# Patient Record
Sex: FEMALE | Race: WHITE | NOT HISPANIC OR LATINO | Employment: STUDENT | URBAN - METROPOLITAN AREA
[De-identification: names, ages, dates, MRNs, and addresses within clinical notes are randomized per-mention and may not be internally consistent; named-entity substitution may affect disease eponyms.]

---

## 2017-06-15 ENCOUNTER — TRANSCRIBE ORDERS (OUTPATIENT)
Dept: ADMINISTRATIVE | Facility: HOSPITAL | Age: 22
End: 2017-06-15

## 2017-06-15 DIAGNOSIS — G40.319 GENERALIZED CONVULSIVE EPILEPSY WITH INTRACTABLE EPILEPSY (HCC): Primary | ICD-10-CM

## 2017-06-27 ENCOUNTER — HOSPITAL ENCOUNTER (OUTPATIENT)
Dept: NEUROLOGY | Facility: AMBULATORY SURGERY CENTER | Age: 22
Discharge: HOME | End: 2017-06-27
Payer: COMMERCIAL

## 2017-06-27 DIAGNOSIS — G40.319 GENERALIZED CONVULSIVE EPILEPSY WITH INTRACTABLE EPILEPSY (HCC): ICD-10-CM

## 2017-06-27 PROCEDURE — 95819 EEG AWAKE AND ASLEEP: CPT

## 2017-06-29 ENCOUNTER — GENERIC CONVERSION - ENCOUNTER (OUTPATIENT)
Dept: OTHER | Facility: OTHER | Age: 22
End: 2017-06-29

## 2017-07-05 ENCOUNTER — ALLSCRIPTS OFFICE VISIT (OUTPATIENT)
Dept: OTHER | Facility: OTHER | Age: 22
End: 2017-07-05

## 2017-07-05 LAB
BACTERIA UR QL AUTO: NEGATIVE
BILIRUB UR QL STRIP: NORMAL
CLARITY UR: NORMAL
CLUE CELL (HISTORICAL): NEGATIVE
COLOR UR: NORMAL
GLUCOSE (HISTORICAL): NORMAL
HGB UR QL STRIP.AUTO: NORMAL
HYPHAL YEAST (HISTORICAL): NEGATIVE
KETONES UR STRIP-MCNC: NORMAL MG/DL
KOH PREP (HISTORICAL): NEGATIVE
LEUKOCYTE ESTERASE UR QL STRIP: NORMAL
NITRITE UR QL STRIP: NORMAL
PH UR STRIP.AUTO: 4.5 [PH]
PH UR STRIP.AUTO: 5 [PH]
PROT UR STRIP-MCNC: NORMAL MG/DL
SP GR UR STRIP.AUTO: 1.03
TRICHOMONAS (HISTORICAL): NEGATIVE
UROBILINOGEN UR QL STRIP.AUTO: 0.2
YEAST (HISTORICAL): POSITIVE

## 2017-07-06 ENCOUNTER — LAB REQUISITION (OUTPATIENT)
Dept: LAB | Facility: HOSPITAL | Age: 22
End: 2017-07-06
Payer: COMMERCIAL

## 2017-07-06 DIAGNOSIS — N89.8 OTHER SPECIFIED NONINFLAMMATORY DISORDER OF VAGINA: ICD-10-CM

## 2017-07-06 PROCEDURE — 87660 TRICHOMONAS VAGIN DIR PROBE: CPT | Performed by: PHYSICIAN ASSISTANT

## 2017-07-06 PROCEDURE — 87510 GARDNER VAG DNA DIR PROBE: CPT | Performed by: PHYSICIAN ASSISTANT

## 2017-07-06 PROCEDURE — 87480 CANDIDA DNA DIR PROBE: CPT | Performed by: PHYSICIAN ASSISTANT

## 2017-07-08 LAB
CANDIDA RRNA VAG QL PROBE: POSITIVE
G VAGINALIS RRNA GENITAL QL PROBE: POSITIVE
T VAGINALIS RRNA GENITAL QL PROBE: NEGATIVE

## 2017-07-21 ENCOUNTER — GENERIC CONVERSION - ENCOUNTER (OUTPATIENT)
Dept: OTHER | Facility: OTHER | Age: 22
End: 2017-07-21

## 2017-07-27 ENCOUNTER — LAB REQUISITION (OUTPATIENT)
Dept: LAB | Facility: HOSPITAL | Age: 22
End: 2017-07-27
Payer: COMMERCIAL

## 2017-07-27 ENCOUNTER — ALLSCRIPTS OFFICE VISIT (OUTPATIENT)
Dept: OTHER | Facility: OTHER | Age: 22
End: 2017-07-27

## 2017-07-27 DIAGNOSIS — N89.8 OTHER SPECIFIED NONINFLAMMATORY DISORDER OF VAGINA: ICD-10-CM

## 2017-07-27 LAB
BACTERIA UR QL AUTO: NEGATIVE
CLUE CELL (HISTORICAL): NEGATIVE
HYPHAL YEAST (HISTORICAL): NEGATIVE
PH UR STRIP.AUTO: NORMAL [PH]
TRICHOMONAS (HISTORICAL): NEGATIVE
YEAST (HISTORICAL): POSITIVE

## 2017-07-27 PROCEDURE — 87480 CANDIDA DNA DIR PROBE: CPT | Performed by: PHYSICIAN ASSISTANT

## 2017-07-27 PROCEDURE — 87510 GARDNER VAG DNA DIR PROBE: CPT | Performed by: PHYSICIAN ASSISTANT

## 2017-07-27 PROCEDURE — 87660 TRICHOMONAS VAGIN DIR PROBE: CPT | Performed by: PHYSICIAN ASSISTANT

## 2017-07-28 LAB
CANDIDA RRNA VAG QL PROBE: NEGATIVE
G VAGINALIS RRNA GENITAL QL PROBE: NEGATIVE
T VAGINALIS RRNA GENITAL QL PROBE: NEGATIVE

## 2017-11-20 ENCOUNTER — GENERIC CONVERSION - ENCOUNTER (OUTPATIENT)
Dept: OTHER | Facility: OTHER | Age: 22
End: 2017-11-20

## 2017-12-26 ENCOUNTER — GENERIC CONVERSION - ENCOUNTER (OUTPATIENT)
Dept: OTHER | Facility: OTHER | Age: 22
End: 2017-12-26

## 2018-01-11 NOTE — PROCEDURES
Procedures by Manpreet Sandoval MD  at 2017  7:49 PM      Author:  Manpreet Sandoval MD Service:  (none) Author Type:  Physician    Filed:  2017  7:50 PM Date of Service:  2017  7:49 PM Status:  Signed    :  Manpreet Sandoval MD (Physician)         [address]  [place], [place]  Phone [phone]  Fax [phone]                                Patient Name: Javy Mills       Date performed:  2017 Medical Record #: [id number]   Report date: 17 File #: [id number]    Referring physician: Dr Manpreet Sandoval ACCT#: -   : 1995 Study type: Routine, awake and asleep   Age: 25 y Technologist: SERENA Lisa EEG  T   Location: Outpatient ICD diagnosis: -         ELECTROENCEPHALOGRAM      Patient History:  27 yr old female presents for a sleep deprived EEG  She was diagnosed with primary generalized epilepsy, based upon her friends telling her that  she was having episodes of unresponsive zoning out and would stop mid-sentence then continue talking as before; also abnormal EEGs  She remains successfully maintained on Keppra and reports that she is doing well with no symptoms at this time  Her last seizure was three years ago  She does have a family history of epilepsy (mom)  Medications:  Levetiracetam (Keppra), Folic acid, Vit D, birth control      Description of Procedure: This EEG was performed with simultaneous video recording  Electrodes were applied using the International 10-20 System, with additional electrodes used including EOG, ECG and T1/T2  The EEG was recorded from 7:42:24 AM  until 08:56:42 AM for  a total of 39 3 minutes  The recording was technically satisfactory for interpretation  Skull Defect (if any):  None  Findings:     State: This sleep-deprived EEG was obtained during wakefulness, drowsiness, and un-sedated light sleep  Activity:    The background during wakefulness consisted of moderate amplitude, very well-modulated 9 - 10 Hz alpha activity located symmetrically over the posterior head regions with good reactivity and attenuation to eye opening  Upon repeated  eye closure, a fixation-off phenomenon was specifically not noted on this current study  Mu rhythms were seen intermittently in the rolandic regions  Photic stimulation produced a prominent and symmetric occipital  driving response at supra-harmonics, to a broad range of flash frequencies, and was specifically non-activating for photo-paroxysmal abnormalities  Drowsiness and light sleep were reflected by slowing and attenuation of background rhythms, accompanied  by very well-formed and symmetrically placed sleep transients including POSTs, BETS, Vertex waves, sleep spindles and K-complexes  Abnormal findings:   Hyperventilation, produced several bursts of sharply-countoured theta-frequency activity in the right & left mid-postero-temporal regions independently, and rare discrete sharp waves arising from the same derivations  During drowsiness and early Stage 2 sleep, there were two occurrences of low-amplitude, bi-occipital spike-slow wave transients of distinctly different morphology than her prevalent POSTs  No sustained ictal rhythmic discharges were noted  Other findings: The single lead ECG revealed a sinus arrhythmia ranging from 60 72 bpm     Interpretation: This is a mildly abnormal sleep-deprived EEG due to both bi-occipital and multi-focal sharp activity suggesting continued epileptogenic potential , and consistent with this patients concurrent clinical diagnoses of primary generalized  and localization-related epilepsies  However, a photo-paroxysmal response is no longer evident  Ana LOMBARDO  [facility]                       Received for:Provider  EPIC   Jun 29 2017  7:50PM [facility] Standard Time

## 2018-01-11 NOTE — PROGRESS NOTES
Assessment    1  Counseling for initiation of birth control method (V2) (Z30 9)    Plan  Counseling for initiation of birth control method    · Lo Loestrin Fe 1 MG-10 MCG / 10 MCG Oral Tablet; TAKE 1 TABLET DAILY   Rx By: Saul Stoddard; Dispense: 28 Days ; #:28 Tablet; Refill: 9; For: Counseling for initiation of birth control method; GOPAL = N; Sent To: [facility]ARM[facility]Y #[id number]   Discussion/Summary  Contraception: Impression: contraception management  The goals of contraception include cycle control and to improve dysmenorrhea  Currently, the patient has satisfactory contraception, tolerable side effects and symptoms that are well controlled  There are no changes in medication management  Patient discussion: discussed with the patient, 20 minute visit, greater than half of the time was spent on counseling  Discussion Summary:   Patient will have her blood pressure taken while she is in school  We'll call us if there is any problems with an elevation  We'll also see her when she comes home on her summer break  Will start the Gardasil then so we can get the first 2 injections in and do the third when she is on  break         Chief Complaint  Chief Complaint Free Text Note Form: Pt is here for pill check  History of Present Illness  HPI: 44-year-old  0 para 0 who does not get her period on birth control here today for a pill check  She denies aches  She had an episode in school last week where she wasn't feeling well was running a temperature had a high pulse and blood pressure but was diagnosed with a kidney infection  Today her blood pressures following  She also states she is interested in getting the Gardasil injection  Active Problems    1  Counseling for initiation of birth control method () (Z30 9)   2  Encounter for gynecological examination without abnormal finding () (Z01 419)    Social History    · Never a smoker    Current Meds   1  Folic Acid TABS;    Therapy: (Recorded:62Uin7471) to Recorded   2  Keppra 500 MG Oral Tablet; Therapy: (Recorded:38Bhj0307) to Recorded   3  Lo Loestrin Fe 1 MG-10 MCG / 10 MCG Oral Tablet; TAKE 1 TABLET DAILY; Therapy: 23Ukg8250 to (Evaluate:08Jbg4660)  Requested for: 80Mys8482; Last   Rx:50Wxf6603 Ordered    Allergies    1  No Known Drug Allergies    Vitals  Vital Signs    Recorded: 28CCJ2780 60:31GL   Systolic 796, LUE   Diastolic 72, LUE   Height 5 ft 1 5 in   Weight 115 lb    BMI Calculated 21 38   BSA Calculated 1 5   BMI Percentile 47 %   2-20 Stature Percentile 14 %   2-20 Weight Percentile 25 %   LMP 12/6/2016/spotting     Signatures   Electronically signed by : Elena Chapin CNM;  Dec 20 2016  2:58PM EST                       (Author)

## 2018-01-12 NOTE — MISCELLANEOUS
Message   Recorded as Task   Date: 07/20/2017 03:01 PM, Created By: Iwona Leija   Task Name: Call Back   Assigned To: [facility],Team   Regarding Patient: Chiquis Cochran, Status: In Progress   Comment:    Evelyn Phelps - 20 Jul 2017 3:01 PM     TASK CREATED  Caller: Self; Other; [phone] (Home)  pt is taking meds for Kyr[place] & bacterial inf, she thinks she still has yeast inf,  she does NOT have an odor, but discharge is white & clumpy, pls call pt [phone]   Aaliyah Vidales - 20 Jul 2017 3:05 PM     TASK IN PROGRESS   Aaliyah Vidales - 20 Jul 2017 3:14 PM     TASK EDITED  Spoke with pt - she was seen on 07/05, saw Wendy Kirkland for yeast infection and BV symptoms  Was rx Diflucan and Metronidazole  Diflucan last taken 07/08 and she finished the Metronidazole 2 days ago - Pt states she is still experiencing yeast infection symptoms - has a thick white clumpy discharge and burning during urination  No itching or odor  She would like a refill on the Diflucan  Please advise  Thanks! Mariela Gonzales - 20 Jul 2017 4:44 PM     TASK REPLIED TO: Previously Assigned To Mariela Gonzales  OK - one more diflucan tablet then if not resolved she must schedule another visit to be seen  Thanks   Aaliyah Vidales - 21 Jul 2017 8:09 AM     TASK EDITED  Spoke with pt - advised RI7 recommended Diflucan  Stated if the sx don't resolve after 48 hours of last pill taken to call to make an appointment  Active Problems    1  Counseling for initiation of birth control method (V25 02) (Z30 09)   2  Encounter for gynecological examination without abnormal finding (V72 31) (Z01 419)   3  Vaginal discharge (623 5) (N89 8)   4  Vaginal discharge (623 5) (N89 8)    Current Meds   1  Fluconazole 150 MG Oral Tablet; TAKE 1 TABLET NOW AND AGAIN IN 3 DAYS; Therapy: 24VWO4914 to (Last Rx:53Bbn4568)  Requested for: 51KPK9443 Ordered   2  Folic Acid TABS; Therapy: (Recorded:63Lcq2454) to Recorded   3   Keppra 500 MG Oral Tablet (LevETIRAcetam); Therapy: (Recorded:64Nju8449) to Recorded   4  Lo Loestrin Fe 1 MG-10 MCG / 10 MCG Oral Tablet; TAKE 1 TABLET DAILY; Therapy: 56Mqi7711 to (Evaluate:16Jnr4400)  Requested for: 98Yal5056; Last   Rx:49Qfe1405 Ordered   5  Lo Loestrin Fe 1 MG-10 MCG / 10 MCG Oral Tablet; TAKE 1 TABLET DAILY; Therapy: 30Trm7921 to (Evaluate:83Cwi5962)  Requested for: 51Ylf4829; Last   Rx:02Rea0898 Ordered   6  MetroNIDAZOLE 0 75 % Vaginal Gel (MetroGel-Vaginal); INSERT 1 APPLICATORFUL   INTRAVAGINALLY AT BEDTIME NIGHTLY; Therapy: 45MDZ8093 to (Evaluate:53Zzz3069)  Requested for: 93UBG6441; Last   Rx:01Umc1768 Ordered    Allergies    1   No Known Drug Allergies    Plan  Vaginal discharge    · Fluconazole 150 MG Oral Tablet; TAKE 1 TABLET NOW AND AGAIN IN 3 DAYS    Signatures   Electronically signed by : Oda Osler, ; Jul 21 2017  8:09AM EST                       (Author)

## 2018-01-12 NOTE — MISCELLANEOUS
Message   Recorded as Task   Date: 11/20/2017 03:45 PM, Created By: Stuart Lambert   Task Name: Med Renewal Request   Assigned To: Gill Matias   Regarding Patient: Javed Ramirez, Status: In Progress   Comment:    Ana Ramirez - 20 Nov 2017 3:45 PM     TASK CREATED  Pt called office today, left voicemail message  Pt saw JOSH Dawson on 7/27/17, scheduled to see her again on 12/19/17  Pt called to request Rx refill of her birth control pill (Lo Loestrin FE)  Pt [place] [facility] [place] [facility] [place] [place] [place] (Pharmacy address listed in Pt's EHR)  Pt can be reached @ [phone]  Thank you! Aaliyah Vidales - 20 Nov 2017 3:51 PM     TASK IN PROGRESS   Aaliyah Vidales - 20 Nov 2017 3:53 PM     TASK EDITED  Patient is currently out of town - needs refill of her OCP - ok to fill? Please advise  Thanks! Bushra Dawson - 20 Nov 2017 4:28 PM     TASK REPLIED TO: Previously Assigned To Bushra Dawson  one pack only   Sue Foot - 20 Nov 2017 6:07 PM     TASK EDITED  Rx Loloestrin, sig 1 od, 1 mo, no refills to [phone]  [facility] in [place]        Active Problems    1  Counseling for initiation of birth control method (V25 02) (Z30 09)   2  Encounter for gynecological examination without abnormal finding (V72 31) (Z01 419)   3  Vaginal burning (625 8) (N94 9)   4  Vaginal discharge (623 5) (N89 8)   5  Vaginal discharge (623 5) (N89 8)    Current Meds   1  Fluconazole 150 MG Oral Tablet; TAKE 1 TABLET NOW AND AGAIN IN 3 DAYS; Therapy: 43YTJ7493 to (Last Rx:04Kae6083)  Requested for: 92Jwj3230 Ordered   2  Folic Acid TABS; Therapy: (Recorded:91Ufz8255) to Recorded   3  Keppra 500 MG Oral Tablet (LevETIRAcetam); Therapy: (Recorded:52Pvl8191) to Recorded   4  Lo Loestrin Fe 1 MG-10 MCG / 10 MCG Oral Tablet; TAKE 1 TABLET DAILY;    Therapy: 12AWH4897 to (Evaluate:28Flm8971)  Requested for: 00Tsq0150; Last   Rx:71Dbi4776 Ordered    Allergies    1  No Known Drug Allergies    Signatures   Electronically signed by :  Diana Kasper, ; Nov 20 2017  6:11PM EST                       (Author)

## 2018-01-12 NOTE — PROCEDURES
Procedures by Ira Funez MD  at 2016  7:22 PM      Author:  Ira Funez MD Service:  (none) Author Type:  Physician     Filed:  2016  7:23 PM Date of Service:  2016  7:22 PM Status:  Signed     :  Ira Funez MD (Physician)            [address]  [place] [address] [facility]  Phone [phone]  Fax [phone]                                                  Patient Name: Sourav Hood       Date performed:  2016 Medical Record #: [id number]   Report date: 16 File #: [id number]   Referring physician: Dr Ira Funez ACCT#: -   : 1995 Study type: Routine, awake and asleep   Age: 27 y Technologist: SERENA Hooks EEG T    Location: Outpatient ICD diagnosis: -           ELECTROENCEPHALOGRAM      Patient History:  The patient is an 27 yr old female who was diagnosed with primary generalized epilepsy, Gastaut variant, based upon her friends telling her that  she was having episodes of unresponsive zoning out and previous abnormal EEGs  She remains successfully maintained on Keppra; she is feeling well with no clinical symptoms or seizures reported  She does have a family history of epilepsy  Medications:  Levetiracetam (Keppra) ,  Folic acid, Biotin, Multi vitamin, Vit D      Description of Procedure: This EEG was performed with simultaneous video recording  Electrodes were applied using the International 10-20 System, with additional electrodes used including EOG, ECG and T1/T2  The EEG was recorded from 7:46:37 AM  to 08:54:33 AM for a  total of 30 3 minutes  The recording was technically satisfactory for interpretation  Skull Defect (if any):  None  Findings:     State: This sleep-deprived EEG was obtained during wakefulness, drowsiness, and un-sedated light sleep  Activity:    The background during wakefulness consisted of moderate amplitude, well-modulated 9 - 10 Hz alpha activity located symmetrically over the posterior head regions with good reactivity and attenuation to eye opening  A solitary instance  of possible fixation-off phenomenon was noted upon eye closure at 8:26:53  this consisted of a brief (< 1 sec) paroxysm of low-amplitude, generalized bi-occipitally maximal, fast spike-slow wave activity followed by her  normal alpha background  No clinical accompaniments were noted, and other episodes of eye closure during the study were not accompanied by this finding  Hyperventilation was performed with good effort for 4 minutes; it produced a few brief bursts of sharply-contoured theta activity but no definite epileptiform transients  Photic stimulation  produced a symmetric occipital driving response to a broad range of flash frequencies, and was non-activating for abnormalities  Specifically, her previously-noted photo-paroxysmal responses were not seen on this tracing  Drowsiness and light sleep  were reflected by slowing and attenuation of background rhythms, accompanied by very well-formed, symmetrically placed sleep transients  Sleep was non-activating  Other findings: The single lead ECG revealed a normal sinus rhythm averaging 60 bpm without ectopy  Interpretation: This is an essentially normal sleep-deprived EEG, obtained during wakefulness and sleep, with the exception of a possible single instance of fixation-off epileptiform activity which would be consistent with her undewrlying  diagnosis of primary generalized epilepsy  It is improved in comparison to the previous study of 5/2015 in that photo-paroxysmal responses are no longer noted  Ana LOMBARDO  [facility]               Received for:Provider  EPIC   Jun 26 2016  7:22PM [facility] Standard Time

## 2018-01-13 VITALS — WEIGHT: 120 LBS | DIASTOLIC BLOOD PRESSURE: 66 MMHG | SYSTOLIC BLOOD PRESSURE: 102 MMHG

## 2018-01-14 VITALS — BODY MASS INDEX: 21.93 KG/M2 | DIASTOLIC BLOOD PRESSURE: 68 MMHG | WEIGHT: 118 LBS | SYSTOLIC BLOOD PRESSURE: 104 MMHG

## 2018-01-18 NOTE — MISCELLANEOUS
Message   Recorded as Task   Date: 11/20/2017 03:45 PM, Created By: Coleen Leal   Task Name: Med Renewal Request   Assigned To: Moises Still   Regarding Patient: Shanice Sullivan, Status: In Progress   Comment:    Ana Ramirez - 20 Nov 2017 3:45 PM     TASK CREATED  Pt called office today, left voicemail message  Pt saw JOSH Dawson on 7/27/17, scheduled to see her again on 12/19/17  Pt called to request Rx refill of her birth control pill (Lo Loestrin FE)  Pt [place] [facility] [place] [place] (Pharmacy address listed in Pt's EHR)  Pt can be reached @ [phone]  Thank you! Aaliyah Vidales - 20 Nov 2017 3:51 PM     TASK IN PROGRESS   Aaliyah Vidales - 20 Nov 2017 3:53 PM     TASK EDITED  Patient is currently out of town - needs refill of her OCP - ok to fill? Please advise  Thanks! Bushra Dawson - 20 Nov 2017 4:28 PM     TASK REPLIED TO: Previously Assigned To Bushra Dawson  one pack only   Terra Nathan - 20 Nov 2017 6:07 PM     TASK EDITED  Rx Loloestrin, sig 1 od, 1 mo, no refills to [phone]  [facility] in [place]        Active Problems    1  Counseling for initiation of birth control method (V25 02) (Z30 09)   2  Encounter for gynecological examination without abnormal finding (V72 31) (Z01 419)   3  Vaginal burning (625 8) (N94 9)   4  Vaginal discharge (623 5) (N89 8)   5  Vaginal discharge (623 5) (N89 8)    Current Meds   1  Fluconazole 150 MG Oral Tablet; TAKE 1 TABLET NOW AND AGAIN IN 3 DAYS; Therapy: 32AHD7141 to (Last Rx:85Wfb6355)  Requested for: 03Vaa1789 Ordered   2  Folic Acid TABS; Therapy: (Recorded:86Gxn2625) to Recorded   3  Keppra 500 MG Oral Tablet (LevETIRAcetam); Therapy: (Recorded:11Oub8318) to Recorded   4  Lo Loestrin Fe 1 MG-10 MCG / 10 MCG Oral Tablet; TAKE 1 TABLET DAILY;    Therapy: 77XGD6172 to (Evaluate:09Srl9995)  Requested for: 42Igt8678; Last   Rx:97Pjt6262 Ordered    Allergies    1  No Known Drug Allergies    Signatures   Electronically signed by :  Charo Kasper, ; Nov 20 2017  6:07PM EST                       (Author)

## 2018-01-24 VITALS
BODY MASS INDEX: 20.61 KG/M2 | SYSTOLIC BLOOD PRESSURE: 112 MMHG | WEIGHT: 112 LBS | HEIGHT: 62 IN | DIASTOLIC BLOOD PRESSURE: 62 MMHG

## 2018-04-04 ENCOUNTER — TRANSCRIBE ORDERS (OUTPATIENT)
Dept: ADMINISTRATIVE | Facility: HOSPITAL | Age: 23
End: 2018-04-04

## 2018-04-04 DIAGNOSIS — G40.319 GENERALIZED NONCONVULSIVE EPILEPSY WITH INTRACTABLE EPILEPSY (HCC): Primary | ICD-10-CM

## 2018-05-16 ENCOUNTER — HOSPITAL ENCOUNTER (OUTPATIENT)
Dept: NEUROLOGY | Facility: AMBULATORY SURGERY CENTER | Age: 23
Discharge: HOME | End: 2018-05-16
Payer: COMMERCIAL

## 2018-05-16 DIAGNOSIS — G40.319 GENERALIZED NONCONVULSIVE EPILEPSY WITH INTRACTABLE EPILEPSY (HCC): ICD-10-CM

## 2018-05-16 PROCEDURE — 95819 EEG AWAKE AND ASLEEP: CPT

## 2019-01-21 ENCOUNTER — TELEPHONE (OUTPATIENT)
Dept: OBGYN CLINIC | Facility: CLINIC | Age: 24
End: 2019-01-21

## 2019-01-21 NOTE — TELEPHONE ENCOUNTER
Pt called ans service over the weekend for a med refill  She needs to be seen for annual exam as the last time she was seen was 12/2017  LM for pt to call back

## 2019-01-22 ENCOUNTER — TELEPHONE (OUTPATIENT)
Dept: OBGYN CLINIC | Facility: MEDICAL CENTER | Age: 24
End: 2019-01-22

## 2019-01-22 DIAGNOSIS — Z30.9 ENCOUNTER FOR CONTRACEPTIVE MANAGEMENT, UNSPECIFIED TYPE: Primary | ICD-10-CM

## 2019-01-22 NOTE — TELEPHONE ENCOUNTER
Patient needs her birth control prescription but stated that she needs to be seen before it will be refilled  Patient's last yearly was 12 of 2017  Her appointment for 2018 was cancelled  She is currently in [place]

## 2019-06-03 ENCOUNTER — TRANSCRIBE ORDERS (OUTPATIENT)
Dept: ADMINISTRATIVE | Facility: HOSPITAL | Age: 24
End: 2019-06-03

## 2019-06-03 DIAGNOSIS — G40.209 CRYPTOGENIC PARTIAL COMPLEX EPILEPSY (HCC): Primary | ICD-10-CM

## 2019-07-03 ENCOUNTER — OFFICE VISIT (OUTPATIENT)
Dept: OBGYN CLINIC | Facility: CLINIC | Age: 24
End: 2019-07-03
Payer: COMMERCIAL

## 2019-07-03 VITALS — SYSTOLIC BLOOD PRESSURE: 90 MMHG | DIASTOLIC BLOOD PRESSURE: 66 MMHG | BODY MASS INDEX: 23.31 KG/M2 | WEIGHT: 125.4 LBS

## 2019-07-03 DIAGNOSIS — R39.9 UTI SYMPTOMS: Primary | ICD-10-CM

## 2019-07-03 DIAGNOSIS — N89.8 VAGINAL ITCHING: ICD-10-CM

## 2019-07-03 LAB
SL AMB  POCT GLUCOSE, UA: ABNORMAL
SL AMB LEUKOCYTE ESTERASE,UA: ABNORMAL
SL AMB POCT BILIRUBIN,UA: ABNORMAL
SL AMB POCT BLOOD,UA: ABNORMAL
SL AMB POCT CLARITY,UA: ABNORMAL
SL AMB POCT COLOR,UA: YELLOW
SL AMB POCT KETONES,UA: ABNORMAL
SL AMB POCT NITRITE,UA: ABNORMAL
SL AMB POCT PH,UA: 5
SL AMB POCT SPECIFIC GRAVITY,UA: 1
SL AMB POCT URINE PROTEIN: ABNORMAL
SL AMB POCT UROBILINOGEN: 0.2

## 2019-07-03 PROCEDURE — 81002 URINALYSIS NONAUTO W/O SCOPE: CPT | Performed by: PHYSICIAN ASSISTANT

## 2019-07-03 PROCEDURE — 99214 OFFICE O/P EST MOD 30 MIN: CPT | Performed by: PHYSICIAN ASSISTANT

## 2019-07-03 RX ORDER — LEVETIRACETAM 100 MG/ML
2500 SOLUTION ORAL DAILY
COMMUNITY

## 2019-07-03 RX ORDER — FOLIC ACID 1 MG/1
1000 TABLET ORAL DAILY
Refills: 5 | COMMUNITY
Start: 2019-06-25

## 2019-07-03 RX ORDER — NITROFURANTOIN 25; 75 MG/1; MG/1
100 CAPSULE ORAL 2 TIMES DAILY
Qty: 14 CAPSULE | Refills: 0 | Status: SHIPPED | OUTPATIENT
Start: 2019-07-03 | End: 2019-07-10

## 2019-07-03 RX ORDER — UREA 10 %
LOTION (ML) TOPICAL
COMMUNITY
End: 2019-12-03

## 2019-07-03 NOTE — PATIENT INSTRUCTIONS
Rx for Macrobid 100 mg BID x 7 days sent to pharmacy  Go to ER if symptoms become severe    F/u in September for first dose of Gardasil and November for yearly gyn exam

## 2019-07-03 NOTE — PROGRESS NOTES
Assessment/Plan:    No problem-specific Assessment & Plan notes found for this encounter  Diagnoses and all orders for this visit:    UTI symptoms  -     POCT urine dip  -     Cancel: Urine culture; Future  -     nitrofurantoin (MACROBID) 100 mg capsule; Take 1 capsule (100 mg total) by mouth 2 (two) times a day for 7 days  -     Urine culture    Vaginal itching  -     VAGINOSIS DNA PROBE (AFFIRM)    Other orders  -     levETIRAcetam (KEPPRA) 100 mg/mL oral solution; Take 2,500 mg by mouth daily  -     Norethin-Eth Estrad-Fe Biphas (LO LOESTRIN FE) 1 MG-10 MCG / 10 MCG TABS; Lo Loestrin Fe 1 mg-10 mcg (24)/10 mcg (2) tablet  -     Omega-3 Fatty Acids (FISH OIL PO); Take 1 capsule by mouth daily  -     folic acid (FOLVITE) 144 MCG tablet; Take by mouth  -     folic acid (FOLVITE) 1 mg tablet; Take 1,000 mcg by mouth daily  -     Pediatric Multiple Vitamins (CHILDRENS MULTIVITS/EXTRA C PO); Take by mouth  -     Cholecalciferol 2000 units CAPS; Take 2,000 Units by mouth daily        Urine dip negative  Sample sent for culture  Vaginal cultures done  We will call with all results  Rx for Macrobid 100 mg BID x 7 days sent to pharmacy  If symptoms become severe, patient should be seen in the ED  F/u in September for first dose of Gardasil vaccine, and November for yearly gyn exam     Subjective:      Patient ID: Vinod Mireles is a 26 y o  female  Patient is here with complaint of UTI and/or vaginal infection  She is new to our office today  Last gyn visit was in November 2018 while at school in [place]; had Pap smear at that time  Today she complains of urinary frequency, bladder pain/pressure, and burning with urination  She is also experiencing vaginal itching  Symptoms have been present for several days  Patient denies any discharge, odor, pelvic pain, abdominal pain, n/v, and fever/chills  She is sexually active; had STD screening in the fall  Interested in [address] vaccine        The following portions of the patient's history were reviewed and updated as appropriate: allergies, current medications, past family history, past medical history, past social history, past surgical history and problem list     Review of Systems   Constitutional: Negative for chills and fever  Gastrointestinal: Negative for abdominal distention, abdominal pain, nausea and vomiting  Genitourinary: Positive for dysuria and frequency  Negative for difficulty urinating, genital sores, hematuria, menstrual problem, pelvic pain, urgency, vaginal bleeding, vaginal discharge and vaginal pain  Vaginal itching  Objective:      BP 90/66   Wt 56 9 kg (125 lb 6 4 oz)   BMI 23 31 kg/m²          Physical Exam   Constitutional: She is oriented to person, place, and time  Vital signs are normal  She appears well-developed and well-nourished  Genitourinary: Uterus normal  No labial fusion  There is no rash, tenderness, lesion or injury on the right labia  There is no rash, tenderness, lesion or injury on the left labia  Cervix exhibits no motion tenderness, no discharge and no friability  Right adnexum displays no mass, no tenderness and no fullness  Left adnexum displays no mass, no tenderness and no fullness  There is erythema in the vagina  No tenderness or bleeding in the vagina  No vaginal discharge found  Lymphadenopathy: No inguinal adenopathy noted on the right or left side  Neurological: She is alert and oriented to person, place, and time  Skin: Skin is warm and dry  Psychiatric: She has a normal mood and affect  Her behavior is normal  Judgment and thought content normal    Vitals reviewed

## 2019-07-05 ENCOUNTER — TELEPHONE (OUTPATIENT)
Dept: OBGYN CLINIC | Facility: CLINIC | Age: 24
End: 2019-07-05

## 2019-07-05 ENCOUNTER — HOSPITAL ENCOUNTER (OUTPATIENT)
Dept: NEUROLOGY | Facility: HOSPITAL | Age: 24
Discharge: HOME | End: 2019-07-05
Attending: PSYCHIATRY & NEUROLOGY
Payer: COMMERCIAL

## 2019-07-05 DIAGNOSIS — G40.209 CRYPTOGENIC PARTIAL COMPLEX EPILEPSY (HCC): ICD-10-CM

## 2019-07-05 PROCEDURE — 95819 EEG AWAKE AND ASLEEP: CPT

## 2019-07-05 NOTE — TELEPHONE ENCOUNTER
Spoke with patient regarding culture results  Urine negative; can stop Macrobid  Vaginal culture positive for yeast   Recommended patient to use OTC Monistat 7 day for treatment  Call if symptoms do not resolve

## 2019-07-11 ENCOUNTER — TRANSCRIBE ORDERS (OUTPATIENT)
Dept: SLEEP CENTER | Facility: CLINIC | Age: 24
End: 2019-07-11

## 2019-07-11 DIAGNOSIS — G40.209 CRYPTOGENIC PARTIAL COMPLEX EPILEPSY (HCC): Primary | ICD-10-CM

## 2019-12-03 ENCOUNTER — OFFICE VISIT (OUTPATIENT)
Dept: OBGYN CLINIC | Facility: HOSPITAL | Age: 24
End: 2019-12-03
Payer: COMMERCIAL

## 2019-12-03 ENCOUNTER — HOSPITAL ENCOUNTER (OUTPATIENT)
Dept: RADIOLOGY | Facility: HOSPITAL | Age: 24
Discharge: HOME | End: 2019-12-03
Payer: COMMERCIAL

## 2019-12-03 VITALS
BODY MASS INDEX: 23.92 KG/M2 | DIASTOLIC BLOOD PRESSURE: 77 MMHG | SYSTOLIC BLOOD PRESSURE: 107 MMHG | HEIGHT: 62 IN | HEART RATE: 99 BPM | WEIGHT: 130 LBS

## 2019-12-03 DIAGNOSIS — M25.551 BILATERAL HIP PAIN: ICD-10-CM

## 2019-12-03 DIAGNOSIS — M76.32 ILIOTIBIAL BAND SYNDROME OF LEFT SIDE: ICD-10-CM

## 2019-12-03 DIAGNOSIS — M25.552 BILATERAL HIP PAIN: ICD-10-CM

## 2019-12-03 DIAGNOSIS — M25.551 BILATERAL HIP PAIN: Primary | ICD-10-CM

## 2019-12-03 DIAGNOSIS — M76.31 IT BAND SYNDROME, RIGHT: ICD-10-CM

## 2019-12-03 DIAGNOSIS — M25.552 BILATERAL HIP PAIN: Primary | ICD-10-CM

## 2019-12-03 PROCEDURE — 99203 OFFICE O/P NEW LOW 30 MIN: CPT | Performed by: PHYSICIAN ASSISTANT

## 2019-12-03 PROCEDURE — 72170 X-RAY EXAM OF PELVIS: CPT

## 2019-12-03 NOTE — PROGRESS NOTES
Assessment/Plan   Diagnoses and all orders for this visit:    Bilateral hip pain    Iliotibial band syndrome of left side    Iliotibial band syndrome, right    - start PT  - Ice, NSAIDs as needed  - Follow up with  sports medicine in 6 weeks          Subjective   Patient ID: Gabriela Goltz is a 27 y o  female  Vitals:    12/03/19 1623   BP: 107/77   Pulse: 80     23yo female comes in for an evaluation of her b/l hips  She started having right lateral hip pain about a month ago and it resolved with some ITB stretching  Then last week, she started having the pain in both hips  There was no injury  This happened once before, a few years ago and was relieved then with IT band stretches  The pain does not radiate and is not associated with numbness  No back pain  No clicking or popping  The following portions of the patient's history were reviewed and updated as appropriate: allergies, current medications, past family history, past medical history, past social history, past surgical history and problem list     Review of Systems  Ortho Exam  History reviewed  No pertinent past medical history  History reviewed  No pertinent surgical history  Family History   Problem Relation Age of Onset    Breast cancer additional onset Paternal Grandmother     Diabetes Paternal Grandmother     Heart failure Paternal Grandfather      Social History     Occupational History    Not on file   Tobacco Use    Smoking status: Never Smoker    Smokeless tobacco: Never Used   Substance and Sexual Activity    Alcohol use: Never     Frequency: Never    Drug use: Never    Sexual activity: Yes     Partners: Male       Review of Systems   Constitutional: Negative  HENT: Negative  Eyes: Negative  Respiratory: Negative  Cardiovascular: Negative  Gastrointestinal: Negative  Endocrine: Negative  Genitourinary: Negative  Musculoskeletal: As below      Allergic/Immunologic: Negative  Neurological: Negative  Hematological: Negative  Psychiatric/Behavioral: Negative  Objective   Physical Exam      I have personally reviewed pertinent films in PACS and my interpretation is no fracture  · Constitutional: Awake, Alert, Oriented  · Eyes: EOMI  · Psych: Mood and affect appropriate  · Heart: regular rate and rhythm  · Lungs: No audible wheezing  · Abdomen: soft  · Lymph: no lymphedema       bilateral Hip:  - Appearance   No swelling, discoloration, deformity, or ecchymosis  - Palpation   No tenderness about the SI joint, iliac crest, anterior hip, or greater trochanter  Mild tenderness between the iliac crest and the greater trochanter  Right > Left   - ROM   Flexion: 120, ER: 50, IR: 20 and Abduction: 40  Symmetrical b/l    Full pain-free AROM of the LSpine   - Special Tests   Straight leg raise negative No pain with log rolling + Monique b/l  - Motor   normal 5/5 in all planes  - NVI distally

## 2019-12-03 NOTE — PATIENT INSTRUCTIONS
Ice Pack Application   WHAT YOU NEED TO KNOW:   Ice can be used to decrease swelling and pain after an injury or surgery  Common injuries that may benefit from ice therapy are sprains, strains, and bruises  The use of ice is most effective in the first 1 to 3 days after an injury  DISCHARGE INSTRUCTIONS:   How to apply ice:   · Fill a bag with crushed ice about half full  Remove the air from the bag before you close it  You can also use a bag of frozen vegetables  · Wrap the ice pack in a cloth to protect your skin from frostbite or other injury  · Put the ice over the injured area for 20 to 30 minutes or as long as directed  · Check your skin after about 30 seconds for color changes or blistering  Remove the ice if you notice skin changes or you feel burning or numbness in the area  · Throw the ice pack away after use  · Apply ice to your injured area 4 times each day or as directed  Ask your healthcare provider how many days you should apply ice  Contact your healthcare provider if:   · You see blisters, whitening of your skin, or a bluish color to your skin after using ice  · You feel burning or numbness when using ice  · You have questions about the use of ice packs  © 2017 [facility] Information is for End User's use only and may not be sold, redistributed or otherwise used for commercial purposes  All illustrations and images included in CareNotes® are the copyrighted property of [employer]  or Dharmesh Carrera  The above information is an  only  It is not intended as medical advice for individual conditions or treatments  Talk to your doctor, nurse or pharmacist before following any medical regimen to see if it is safe and effective for you  Safe Use of NSAIDs   WHAT YOU NEED TO KNOW:   NSAIDs are medicines that are used to decrease pain, swelling, and fever  NSAIDs are available with or without a doctor's order   NSAIDs that you can buy without a doctor's order include aspirin, ibuprofen, and naproxen  DISCHARGE INSTRUCTIONS:   Return to the emergency department if:   · You have swelling around your mouth or trouble breathing  · You are breathing fast or you have a fast heartbeat  · You have nausea, vomiting, or abdominal pain  · You have blood in your vomit or bowel movements  · You have a seizure  Contact your healthcare provider if:   · You have a headache or become confused  · You develop hearing loss or ringing in your ears  · You develop itching, a rash, or hives  · You have swelling around your lower legs, feet, ankles, and hands  · You do not know how much NSAIDs to give to your child  · You have questions or concerns about your condition or care  How to give NSAIDs to your child safely:   · Read the directions on the label  Find out if the medicine is right for your child's age and how much to give to your child  The dose for your child's weight or age should be listed  Do not  give your child more than the recommended amount  · Use the measuring tool that came with the medicine  Do not  use another measuring tool, such as a kitchen spoon  Other measuring tools do not provide the right amount of medicine  How to take NSAIDs safely:   · Read the directions on the label to learn how much medicine you should take and often to take it  Do not take more than the recommended amount  · Talk to your healthcare provider if you need take NSAIDs for more than 30 days  The longer you take NSAIDs, the higher your risk of side effects will be  You may need to take other medicines to decrease your risk of side effects such as stomach bleeding  · Do not take an over-the-counter NSAIDs with prescription NSAIDs  The combined amount of NSAIDs may be too high  · Tell your healthcare provider about other medicines you take  Some medicines can increase the risk of side effects from NSAIDs   Your healthcare provider will tell you if it is okay to take NSAIDs and how to take them  Who should not take NSAIDs:  Certain people should avoid or limit NSAIDs  Do not  give NSAIDs to children under 10months of age without direction from your child's doctor  Do not give aspirin to children under 25years of age  Your child could develop Reye syndrome if he takes aspirin  Reye syndrome can cause life-threatening brain and liver damage  Check your child's medicine labels for aspirin, salicylates, or oil of wintergreen  Talk to your healthcare provider before you take NSAIDs if any of the following apply to you:  · You have reflux disease, a peptic ulcer, H pylori infection, or bleeding in your stomach or intestines  · You have a bleeding disorder, or you take blood-thinning medicine  · You are allergic to aspirin or other NSAIDs  · You have liver or kidney or disease  · You have high blood pressure or heart disease  · You have 3 or more alcoholic drinks each day  · You are pregnant  What you need to know about an NSAID overdose:  Certain health problems can occur if you take too much NSAID medicine at one time or over time  Problems include nausea, vomiting, and abdominal pain  You may develop gastritis, peptic ulcers, and stomach bleeding  You may also develop fluid retention, heart problems, and kidney problems  NSAIDs can worsen high blood pressure  You may become confused, or you may have a headache, hearing loss, or hallucinations  An overdose of aspirin may also cause rapid breathing, a rapid heartbeat, or seizures  What to do if you think you or your child took too much NSAID medicine:  Call the [facility] at 1-186.740.1198 immediately  © 2017 [employer]  Information is for End User's use only and may not be sold, redistributed or otherwise used for commercial purposes   All illustrations and images included in CareNotes® are the copyrighted property of ARIS SHERIDAN Shaz[employer]  or Dharmesh Carrera  The above information is an  only  It is not intended as medical advice for individual conditions or treatments  Talk to your doctor, nurse or pharmacist before following any medical regimen to see if it is safe and effective for you

## 2019-12-10 ENCOUNTER — EVALUATION (OUTPATIENT)
Dept: PHYSICAL THERAPY | Facility: CLINIC | Age: 24
End: 2019-12-10
Payer: COMMERCIAL

## 2019-12-10 DIAGNOSIS — M25.551 BILATERAL HIP PAIN: ICD-10-CM

## 2019-12-10 DIAGNOSIS — M76.32 ILIOTIBIAL BAND SYNDROME OF LEFT SIDE: ICD-10-CM

## 2019-12-10 DIAGNOSIS — M25.552 BILATERAL HIP PAIN: ICD-10-CM

## 2019-12-10 DIAGNOSIS — M76.31 IT BAND SYNDROME, RIGHT: ICD-10-CM

## 2019-12-10 PROCEDURE — 97140 MANUAL THERAPY 1/> REGIONS: CPT | Performed by: PHYSICAL MEDICINE & REHABILITATION

## 2019-12-10 PROCEDURE — 97162 PT EVAL MOD COMPLEX 30 MIN: CPT | Performed by: PHYSICAL MEDICINE & REHABILITATION

## 2019-12-10 NOTE — PROGRESS NOTES
PT Evaluation     Today's date: 12/10/2019  Patient name: John Paul Gardner  : 1995  MRN: [id number]  Referring provider: Medardo Garcia  Dx:   Encounter Diagnosis     ICD-10-CM    1  Bilateral hip pain M25 551 Ambulatory referral to Physical Therapy    M25 552    2  Iliotibial band syndrome of left side M76 32 Ambulatory referral to Physical Therapy   3  It band syndrome, right M76 31 Ambulatory referral to Physical Therapy       Start Time: 1730  Stop Time: 1825  Total time in clinic (min): 55 minutes    Assessment  Assessment details: Pt is a 27 y o  female presenting to outpatient physical therapy with Bilateral hip pain  Pt presents with pain, decreased range of motion, decreased strength, and decreased tolerance to activity  Pt would benefit from skilled physical therapy to address limitations and to achieve goals  Thank you for this referral    Impairments: abnormal or restricted ROM, impaired physical strength, lacks appropriate home exercise program and pain with function    Symptom irritability: highUnderstanding of Dx/Px/POC: good   Prognosis: good    Goals  ST  Patient will report 25% decrease in pain in 4 weeks  2  Patient will demonstrate 25% improvement in ROM in 4 weeks  3  Patient will demonstrate 1/2 grade improvement in strength in 4 weeks  LT  Patient will be able to perform IADLS without restriction or pain by discharge  2  Patient will be independent in HEP by discharge  3  Patient will be able to return to recreational/work duties without restriction or pain by discharge        Plan  Patient would benefit from: PT eval and skilled physical therapy  Planned modality interventions: biofeedback, cryotherapy, TENS and thermotherapy: hydrocollator packs  Planned therapy interventions: joint mobilization, neuromuscular re-education, patient education, postural training, strengthening, stretching, therapeutic activities, therapeutic exercise, abdominal trunk stabilization, balance and home exercise program  Frequency: 2x week  Duration in weeks: 4  Treatment plan discussed with: patient        Subjective Evaluation    History of Present Illness  Mechanism of injury: Pt reports experiencing B hip pain described as sharp and numbing  Onset of sxs approximately 3 weeks ago  Pt is a former field  and just graduated 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 in the past  Pt denies injury  She states that onset of sxs occurred one night while sleeping and the pain woke her up  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 that was causing knee pain  She has experienced intermittent back pain during this episode  Pain  Current pain rating: 3  At best pain ratin  At worst pain ratin  Quality: sharp  Progression: no change    Patient Goals  Patient goals for therapy: decreased pain          Objective     Palpation   Left   Tenderness of the piriformis  Right   Tenderness of the piriformis  Tenderness     Left Hip   Tenderness in the greater trochanter  Right Hip   Tenderness in the greater trochanter  Left Knee   Tenderness in the ITB  Right Knee   Tenderness in the ITB  Active Range of Motion   Left Hip   External rotation (90/90): 30 degrees   Internal rotation (90/90): 40 degrees with pain    Right Hip   External rotation (90/90): 30 degrees   Internal rotation (90/90): 40 degrees with pain    Passive Range of Motion   Left Hip   External rotation (90/90): 65 degrees   Internal rotation (90/90): 60 degrees     Right Hip   External rotation (90/90): 65 degrees   Internal rotation (90/90): 60 degrees     Strength/Myotome Testing     Left Hip   Planes of Motion   Flexion: 4-  Extension: 4  Abduction: 3+  External rotation: 3+  Internal rotation: 4    Right Hip   Planes of Motion   Flexion: 4-  Extension: 4  Abduction: 3+  External rotation: 4-  Internal rotation: 4    Tests     Left Hip   Positive piriformis  Negative AMARIS and ORNA  Modified Shaheen: Positive  Right Hip   Positive piriformis  Negative AMARIS and FADIR  Modified Shaheen: Positive         Flowsheet Rows      Most Recent Value   PT/OT G-Codes   Current Score  64   Projected Score  82             Precautions: epilepsy       Manual  12/10            B lumbar roll NC  Gr V            MFD B ITB             IASTM                                           Exercise Diary              Elliptical             B piriformis stretch             Hip flexor stretch             Hip hike to neutral             Hip abd iso c pball             pball bridge c curl             Single leg deadlift             birdog             Suitcase carries             plank             Side plank             squats                                                                                                                                      Modalities

## 2019-12-12 ENCOUNTER — EVALUATION (OUTPATIENT)
Dept: PHYSICAL THERAPY | Facility: CLINIC | Age: 24
End: 2019-12-12
Payer: COMMERCIAL

## 2019-12-12 DIAGNOSIS — M25.551 BILATERAL HIP PAIN: Primary | ICD-10-CM

## 2019-12-12 DIAGNOSIS — M76.32 ILIOTIBIAL BAND SYNDROME OF LEFT SIDE: ICD-10-CM

## 2019-12-12 DIAGNOSIS — M76.31 IT BAND SYNDROME, RIGHT: ICD-10-CM

## 2019-12-12 DIAGNOSIS — M25.552 BILATERAL HIP PAIN: Primary | ICD-10-CM

## 2019-12-12 PROCEDURE — 97112 NEUROMUSCULAR REEDUCATION: CPT | Performed by: PHYSICAL MEDICINE & REHABILITATION

## 2019-12-12 PROCEDURE — 97140 MANUAL THERAPY 1/> REGIONS: CPT | Performed by: PHYSICAL MEDICINE & REHABILITATION

## 2019-12-12 PROCEDURE — 97110 THERAPEUTIC EXERCISES: CPT | Performed by: PHYSICAL MEDICINE & REHABILITATION

## 2019-12-12 NOTE — PROGRESS NOTES
Daily Note     Today's date: 2019  Patient name: Byod Yates  : 1995  MRN: [id number]  Referring provider: Tanvi Faust PA-C  Dx:   Encounter Diagnosis     ICD-10-CM    1  Bilateral hip pain M25 551     M25 552    2  Iliotibial band syndrome of left side M76 32    3  It band syndrome, right M76 31        Start Time: 1730  Stop Time: 1823  Total time in clinic (min): 53 minutes    Subjective: Pt reports compliance with HEP, she was challenged with exercises  Objective: See treatment diary below      Assessment: Tolerated treatment well  Patient demonstrated fatigue post treatment, exhibited good technique with therapeutic exercises, would benefit from continued PT and demonstrated normal tissue response following MFD and IASTM  Plan: Progress treatment as tolerated         Precautions: epilepsy       Manual  12/10 12/12           B lumbar roll NC  Gr V NC  Gr V           MFD B ITB  3' static  1' IR/ER  1' squat  glide           IASTM  NC                                         Exercise Diary              Elliptical 7'            B piriformis stretch 3 x 30' ea            ITB stretch c strap  3 x 30" ea            Hip flexor stretch 3 x 30" kneeling            Hip hike to neutral             Hip abd iso c pball 20 ea c 5"             pball bridge c curl             Single leg deadlift 10 ea            birdog             Suitcase carries             plank             Side plank             squats                                                                                                                                      Modalities

## 2019-12-17 ENCOUNTER — OFFICE VISIT (OUTPATIENT)
Dept: PHYSICAL THERAPY | Facility: CLINIC | Age: 24
End: 2019-12-17
Payer: COMMERCIAL

## 2019-12-17 DIAGNOSIS — M76.31 IT BAND SYNDROME, RIGHT: ICD-10-CM

## 2019-12-17 DIAGNOSIS — M25.551 BILATERAL HIP PAIN: Primary | ICD-10-CM

## 2019-12-17 DIAGNOSIS — M25.552 BILATERAL HIP PAIN: Primary | ICD-10-CM

## 2019-12-17 DIAGNOSIS — M76.32 ILIOTIBIAL BAND SYNDROME OF LEFT SIDE: ICD-10-CM

## 2019-12-17 PROCEDURE — 97140 MANUAL THERAPY 1/> REGIONS: CPT

## 2019-12-17 PROCEDURE — 97112 NEUROMUSCULAR REEDUCATION: CPT

## 2019-12-17 PROCEDURE — 97110 THERAPEUTIC EXERCISES: CPT

## 2019-12-17 NOTE — PROGRESS NOTES
Daily Note     Today's date: 2019  Patient name: Yamileth Bajwa  : 1995  MRN: [id number]  Referring provider: Fabrizio Lane PA-C  Dx:   Encounter Diagnosis     ICD-10-CM    1  Bilateral hip pain M25 551     M25 552    2  Iliotibial band syndrome of left side M76 32    3  It band syndrome, right M76 31                   Subjective: Pt reports with c/o B hip pain in lateral aspect of hip  She noted muscle soreness following last visit, but nothing significant  She noted compliance with HEP and challenged with doing so  Objective: See treatment diary below      Assessment: Tolerated exercises and treatment well  MF/STM restrictions palpated primarliy in distal quads and ITB with TTP during IASTM  Patient demonstrated fatigue post treatment, exhibited good technique with therapeutic exercises and would benefit from continued PT  Plan: Continue per plan of care  Progress treatment as tolerated         Precautions: epilepsy       Manual  12/10 12/12 12/17          B lumbar roll NC  Gr V NC  Gr V NC  Gr V          MFD B ITB  3' static  1' IR/ER  1' squat  glide 3' static  1' IR/ER  1' squat  glide          IASTM  NC TE                                        Exercise Diary             Elliptical 7' 7'           B piriformis stretch 3 x 30' ea 30"x3           ITB stretch c strap  3 x 30" ea 30"x3 c strp           Hip flexor stretch 3 x 30" kneeling 30"x3 kneel           Hip hike to neutral             Hip abd iso c pball 20 ea c 5"  5"  x20  ea           pball bridge c curl  nv           Single leg deadlift 10 ea x10 ea           birdog  nv           Suitcase carries             plank             Side plank             squats                                                                                                                                      Modalities

## 2019-12-18 ENCOUNTER — CLINICAL SUPPORT (OUTPATIENT)
Dept: OBGYN CLINIC | Facility: CLINIC | Age: 24
End: 2019-12-18
Payer: COMMERCIAL

## 2019-12-18 VITALS — WEIGHT: 128.8 LBS | SYSTOLIC BLOOD PRESSURE: 101 MMHG | DIASTOLIC BLOOD PRESSURE: 62 MMHG | BODY MASS INDEX: 23.56 KG/M2

## 2019-12-18 DIAGNOSIS — Z23 NEED FOR HPV VACCINE: Primary | ICD-10-CM

## 2019-12-18 PROCEDURE — 90651 9VHPV VACCINE 2/3 DOSE IM: CPT | Performed by: PHYSICIAN ASSISTANT

## 2019-12-18 PROCEDURE — 90471 IMMUNIZATION ADMIN: CPT | Performed by: PHYSICIAN ASSISTANT

## 2019-12-19 ENCOUNTER — EVALUATION (OUTPATIENT)
Dept: PHYSICAL THERAPY | Facility: CLINIC | Age: 24
End: 2019-12-19
Payer: COMMERCIAL

## 2019-12-19 DIAGNOSIS — M25.551 BILATERAL HIP PAIN: Primary | ICD-10-CM

## 2019-12-19 DIAGNOSIS — M25.552 BILATERAL HIP PAIN: Primary | ICD-10-CM

## 2019-12-19 DIAGNOSIS — M76.32 ILIOTIBIAL BAND SYNDROME OF LEFT SIDE: ICD-10-CM

## 2019-12-19 DIAGNOSIS — M76.31 IT BAND SYNDROME, RIGHT: ICD-10-CM

## 2019-12-19 PROCEDURE — 97112 NEUROMUSCULAR REEDUCATION: CPT

## 2019-12-19 PROCEDURE — 97110 THERAPEUTIC EXERCISES: CPT

## 2019-12-19 NOTE — PROGRESS NOTES
Daily Note     Today's date: 2019  Patient name: Triny Perez  : 1995  MRN: [id number]  Referring provider: Norma Villaseñor PA-C  Dx:   Encounter Diagnosis     ICD-10-CM    1  Bilateral hip pain M25 551     M25 552    2  Iliotibial band syndrome of left side M76 32    3  It band syndrome, right M76 31                   Subjective: Pt denies sharp pain in b/l hips and notes R>L hip soreness that is "much more manageable"  She reports only fatigue and DOMS from LV t/o session today  Objective: See treatment diary below     Precautions: epilepsy     Manual  12/10 12/12 12/17 12/19         B lumbar roll NC  Gr V NC  Gr V NC  Gr V NC  Gr V         MFD B ITB  3' static  1' IR/ER  1' squat  glide 3' static  1' IR/ER  1' squat  glide -         IASTM  NC TE -                                     Exercise Diary            Elliptical 7' 7' L5 7'          B piriformis stretch 3 x 30' ea 30"x3 L2 30"x3 ea          ITB stretch c strap  3 x 30" ea 30"x3 c strp HEP          Hip flexor stretch 3 x 30" kneeling 30"x3 kneel HEP          Hip hike to neutral   -          Hip abd iso c pball 20 ea c 5"  5"x20  ea 5"x20 ea          pball bridge c curl  nv [place] ball 2x10          Single leg deadlift 10 ea x10 ea           birdog  nv -          Suitcase carries   -          plank   -          Side plank   -          S/l hip & knee flex/ext   20x ea          1/2 sideplank w/hip abd iso   20"x3 ea          Monster walks   GTB  4 laps          Sidestep squat   GTB  4 laps          Wall sits w/TB abd   GTB 20"x3          Prone pball iso HS curls, hip ext   [place] ball 5"x20                                                                  Assessment: Tolerated treatment well  Patient demonstrated fatigue post treatment, exhibited good technique with therapeutic exercises and would benefit from continued PT    Plan: Continue per plan of care  Progress treatment as tolerated      Dory Avelar, PTA

## 2020-01-07 ENCOUNTER — OFFICE VISIT (OUTPATIENT)
Dept: PHYSICAL THERAPY | Facility: CLINIC | Age: 25
End: 2020-01-07
Payer: COMMERCIAL

## 2020-01-07 DIAGNOSIS — M25.552 BILATERAL HIP PAIN: Primary | ICD-10-CM

## 2020-01-07 DIAGNOSIS — M76.32 ILIOTIBIAL BAND SYNDROME OF LEFT SIDE: ICD-10-CM

## 2020-01-07 DIAGNOSIS — M76.31 IT BAND SYNDROME, RIGHT: ICD-10-CM

## 2020-01-07 DIAGNOSIS — M25.551 BILATERAL HIP PAIN: Primary | ICD-10-CM

## 2020-01-07 PROCEDURE — 97112 NEUROMUSCULAR REEDUCATION: CPT

## 2020-01-07 PROCEDURE — 97110 THERAPEUTIC EXERCISES: CPT

## 2020-01-07 NOTE — PROGRESS NOTES
Daily Note     Today's date: 2020  Patient name: Annie Lindo  : 1995  MRN: [id number]  Referring provider: Neno Blanco PA-C  Dx:   Encounter Diagnosis     ICD-10-CM    1  Bilateral hip pain M25 551     M25 552    2  Iliotibial band syndrome of left side M76 32    3  It band syndrome, right M76 31        Start Time: 0530  Stop Time: 0625  Total time in clinic (min): 55 minutes    Subjective:  Pt reports her (B) hip/ITB is "much better" since LV  Pt denies (B) hip/ITB pain Sx since LV, reporting only fatigue/soreness /c increased activity levels (walking dogs >0 5mi, HEP, etc ) that dissipates /c stretching - pt states discomfort (L) >(R)        Objective: See treatment diary below    Assessment: Pt reported and demonstrated fatigue post treatment  Pt demonstrated good technique /c TE program and is sufficiently challenged /c current TE program   Pt would benefit from continued PT to improve (B)hip/ITB pain sx, strength, and mobility  Plan: Cont /c PT POC  Progress as tolerated       Precautions: epilepsy   Manual  12/10 12/12 12/17 12/19 1/07        B lumbar roll NC  Gr V NC  Gr V NC  Gr V NC  Gr V         MFD B ITB  3' static  1' IR/ER  1' squat  glide 3' static  1' IR/ER  1' squat  glide -         IASTM  NC TE -                                     Exercise Diary           Elliptical 7' 7' L5 7' L5 7'         B piriformis stretch 3 x 30' ea 30"x3 L2 30"x3 ea          ITB stretch c strap  3 x 30" ea 30"x3 c strp HEP          Hip flexor stretch 3 x 30" kneeling 30"x3 kneel HEP          Hip hike to neutral   -          Hip abd iso c pball 20 ea c 5"  5"x20  ea 5"x20 ea Orange  10"x10ea         pball bridge c curl  nv [place] ball 2x10          Single leg deadlift 10 ea x10 ea           birdog  nv -          Suitcase carries   -          plank   -          Side plank   -          S/l hip & knee flex/ext   20x ea 2# 3x10         1/2 sideplank w/hip abd iso   20"x3 ea 20"x3            Monster walks   GTB  4 laps GTB 6 laps         Sidestep squat   GTB  4 laps GTB 6 laps         Wall sits w/TB abd   GTB 20"x3 BTB 3"x15         Prone pball iso HS curls, hip ext   [facility] ball 5"x20                                                                  Loanne Tanner, PTA

## 2020-01-14 ENCOUNTER — EVALUATION (OUTPATIENT)
Dept: PHYSICAL THERAPY | Facility: CLINIC | Age: 25
End: 2020-01-14
Payer: COMMERCIAL

## 2020-01-14 DIAGNOSIS — M25.551 BILATERAL HIP PAIN: Primary | ICD-10-CM

## 2020-01-14 DIAGNOSIS — M76.32 ILIOTIBIAL BAND SYNDROME OF LEFT SIDE: ICD-10-CM

## 2020-01-14 DIAGNOSIS — M25.552 BILATERAL HIP PAIN: Primary | ICD-10-CM

## 2020-01-14 DIAGNOSIS — M76.31 IT BAND SYNDROME, RIGHT: ICD-10-CM

## 2020-01-14 PROCEDURE — 97164 PT RE-EVAL EST PLAN CARE: CPT | Performed by: PHYSICAL MEDICINE & REHABILITATION

## 2020-01-14 PROCEDURE — 97110 THERAPEUTIC EXERCISES: CPT | Performed by: PHYSICAL MEDICINE & REHABILITATION

## 2020-01-14 PROCEDURE — 97112 NEUROMUSCULAR REEDUCATION: CPT | Performed by: PHYSICAL MEDICINE & REHABILITATION

## 2020-01-15 NOTE — PROGRESS NOTES
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  This is an issue that can flare up on runners if they do not consistently stretch/strengthen their IT bands  Gradually increasing your distance while running can also prevent this issue from flaring as well  CHIEF COMPLAINT:  Follow up bilateral hip pain    HPI:  Mia Hernandez is a 27 y o  female  who presents for       Visit 01/16/2020:  27year old F here today in regards to internal referral from orthopedic PA, Iveth Devlin, for initial evaluation of bilateral hip pain  Ongoing issue for approximately 2 months without precipitating injury  Diagnosed as bilateral ITB syndrome, patient was referred to formal physical therapy which she has attended six sessions  She reports today that she feels much more improved since starting PT  Pain is now only intermittent and located on outside of her hip bilaterally  Review of Systems   Constitutional: Negative for chills, fatigue, fever and unexpected weight change  HENT: Negative for hearing loss, nosebleeds and sore throat  Eyes: Negative for pain, redness and visual disturbance  Respiratory: Negative for cough, shortness of breath and wheezing  Cardiovascular: Negative for chest pain, palpitations and leg swelling  Gastrointestinal: Negative for abdominal pain, nausea and vomiting  Endocrine: Negative for polydipsia and polyuria     Genitourinary: Negative for difficulty urinating and hematuria  Musculoskeletal:        As per HPI   Skin: Negative for rash and wound  Neurological:        As per HPI   Psychiatric/Behavioral: Negative for decreased concentration and suicidal ideas  The patient is not nervous/anxious  Following history reviewed and update:    History reviewed  No pertinent past medical history  History reviewed  No pertinent surgical history  Social History   Social History     Substance and Sexual Activity   Alcohol Use Never    Frequency: Never     Social History     Substance and Sexual Activity   Drug Use Never     Social History     Tobacco Use   Smoking Status Never Smoker   Smokeless Tobacco Never Used     Family History   Problem Relation Age of Onset    Breast cancer additional onset Paternal Grandmother     Diabetes Paternal Grandmother     Heart failure Paternal Grandfather      No Known Allergies       Physical Exam  /80 (BP Location: Left arm, Patient Position: Sitting, Cuff Size: Standard)   Pulse 81   Ht 5' 1" (1 549 m)   Wt 57 6 kg (127 lb)   BMI 24 00 kg/m²     Constitutional:  see vital signs  Gen: well-developed, normocephalic/atraumatic, well-groomed  Eyes: No inflammation or discharge of conjunctiva or lids; sclera clear   Pharynx: no inflammation, lesion, or mass of lips  Neck: supple, no masses, non-distended  MSK: no inflammation, lesion, mass, or clubbing of nails and digits except for other than mentioned below  SKIN: no visible rashes or skin lesions  Pulmonary/Chest: Effort normal  No respiratory distress     NEURO: cranial nerves grossly intact  PSYCH:  Alert and oriented to person, place, and time; recent and remote memory intact; mood normal, no depression, anxiety, or agitation, judgment and insight good and intact     Ortho Exam  BACK EXAM:  Gait: normal, no trendelenberg gait, no antalgic gait    BACK TENDERNESS:  Spinous Processes: no  Paraspinal Muscles: no  SI Joint: no  Sacrum: no    ROM:  Flexion: intact  Extension: intact    DERMATOMAL SENSATION:  L1: normal   L2: normal   L3: normal   L4: normal   L5: normal   S1: normal    STRENGTH (bilateral):  Knee Extension: 5/5  Knee Flexion: 5/5  Foot Dorsiflexion: 5/5  Great Toe Extension: 5/5  Foot Plantarflexion: 5/5  Hip Flexion: 5/5  Hip Abduction: 5/5    BACK:   SUPINE STRAIGHT LEG: negative    HIP:  Tendersness: + Over greater trochanter mildly  LOG ROLL: negative  AMARIS:  Negative groin pain; aggravtes pain over greater trochanter bilaterally  FADIR: negative groin pain  Monique's: + bilaterally           ATTESTATION:  I have personally interviewed and examined patient  I have reviewed hogan findings and plan with resident/fellow as outlined in note  I agree with exam and plan as documented in note  Total visit time was 15 minutes of which more than 50% was face to face counseling and/or coordination of care with patient regarding their treatment plan as outlined in note

## 2020-01-16 ENCOUNTER — OFFICE VISIT (OUTPATIENT)
Dept: OBGYN CLINIC | Facility: OTHER | Age: 25
End: 2020-01-16
Payer: COMMERCIAL

## 2020-01-16 VITALS
HEIGHT: 61 IN | SYSTOLIC BLOOD PRESSURE: 119 MMHG | HEART RATE: 81 BPM | DIASTOLIC BLOOD PRESSURE: 80 MMHG | BODY MASS INDEX: 23.98 KG/M2 | WEIGHT: 127 LBS

## 2020-01-16 DIAGNOSIS — M25.552 BILATERAL HIP PAIN: Primary | ICD-10-CM

## 2020-01-16 DIAGNOSIS — M76.31 IT BAND SYNDROME, RIGHT: ICD-10-CM

## 2020-01-16 DIAGNOSIS — M76.32 ILIOTIBIAL BAND SYNDROME OF LEFT SIDE: ICD-10-CM

## 2020-01-16 DIAGNOSIS — M25.551 BILATERAL HIP PAIN: Primary | ICD-10-CM

## 2020-01-16 PROCEDURE — 99213 OFFICE O/P EST LOW 20 MIN: CPT | Performed by: FAMILY MEDICINE

## 2020-01-17 ENCOUNTER — OFFICE VISIT (OUTPATIENT)
Dept: PHYSICAL THERAPY | Facility: CLINIC | Age: 25
End: 2020-01-17
Payer: COMMERCIAL

## 2020-01-17 DIAGNOSIS — M25.551 BILATERAL HIP PAIN: Primary | ICD-10-CM

## 2020-01-17 DIAGNOSIS — M76.32 ILIOTIBIAL BAND SYNDROME OF LEFT SIDE: ICD-10-CM

## 2020-01-17 DIAGNOSIS — M76.31 IT BAND SYNDROME, RIGHT: ICD-10-CM

## 2020-01-17 DIAGNOSIS — M25.552 BILATERAL HIP PAIN: Primary | ICD-10-CM

## 2020-01-17 PROCEDURE — 97112 NEUROMUSCULAR REEDUCATION: CPT

## 2020-01-17 PROCEDURE — 97110 THERAPEUTIC EXERCISES: CPT

## 2020-01-17 NOTE — PROGRESS NOTES
Daily Note     Today's date: 2020  Patient name: Mauricio Uribe  : 1995  MRN: [id number]  Referring provider: Joaquim Brown PA-C  Dx:   Encounter Diagnosis     ICD-10-CM    1  Bilateral hip pain M25 551     M25 552    2  Iliotibial band syndrome of left side M76 32    3  It band syndrome, right M76 31        Start Time: 725  Stop Time: 815  Total time in clinic (min): 50 minutes  Subjective: Pt reports to today's Tx denying (B/L) hip pain  Objective: See treatment diary below    Assessment: Pt tolerated progression of TE program well  Pt required verbal and tactile cuing for proper core & gluteal muscle recruitment /c good carryover  Pt would benefit from continued PT to further improve (B/L) hip/glute/LE & core strength  Plan: Cont /c PT POC  Progress as tolerated       Precautions: epilepsy     Manual ONCE per week  Manual  12/10 12/12 12/17 12/19 1/07 1/14  1/17         MFD B ITB   3' static  1' IR/ER  1' squat  glide 3' static  1' IR/ER  1' squat  glide -      NV         IAS   NC TE -      NV                                                            Exercise Diary             Elliptical 7' 7' L5 7' L5 7'  8' L5 7'            B piriformis stretch 3 x 30' ea 30"x3 L2 30"x3 ea                 Hip hike to neutral     -    30 ea 1R 30ea            Hip abd iso c pball 20 ea c 5"  5"x20  ea 5"x20 ea Orange  10"x10ea               pball bridge c curl   nv [place] ball 2x10      18# KB blue pball 15x           Single leg deadlift 10 ea x10 ea      18#  10 ea 18# 25ea           birdog   nv -    15 c 5"hold ea 5"x20ea            Suitcase carries     -                 plank     -                 Side plank     -                 1/2 sideplank w/hip abd iso     20"x3 ea 20"x3                  Prone pball iso HS curls, hip ext     Orange ball 5"x20                 Dynamic lunges (lateral & posterior contralateral)           10#ea hand  15xea          side plank - elevated off low mat table            15"x3ea                                                                Long Mittal, PTA

## 2020-01-21 ENCOUNTER — OFFICE VISIT (OUTPATIENT)
Dept: PHYSICAL THERAPY | Facility: CLINIC | Age: 25
End: 2020-01-21
Payer: COMMERCIAL

## 2020-01-21 DIAGNOSIS — M25.552 BILATERAL HIP PAIN: Primary | ICD-10-CM

## 2020-01-21 DIAGNOSIS — M25.551 BILATERAL HIP PAIN: Primary | ICD-10-CM

## 2020-01-21 DIAGNOSIS — M76.32 ILIOTIBIAL BAND SYNDROME OF LEFT SIDE: ICD-10-CM

## 2020-01-21 DIAGNOSIS — M76.31 IT BAND SYNDROME, RIGHT: ICD-10-CM

## 2020-01-21 PROCEDURE — 97112 NEUROMUSCULAR REEDUCATION: CPT

## 2020-01-21 PROCEDURE — 97110 THERAPEUTIC EXERCISES: CPT

## 2020-01-21 NOTE — PROGRESS NOTES
Daily Note     Today's date: 2020  Patient name: Annie Lindo  : 1995  MRN: [id number]  Referring provider: Neno lBanco PA-C  Dx:   Encounter Diagnosis     ICD-10-CM    1  Bilateral hip pain M25 551     M25 552    2  Iliotibial band syndrome of left side M76 32    3  It band syndrome, right M76 31                   Subjective: Pt reports pain with "overdoing it" at the gym  She noted hip pain with gym workouts more on sedentary days at work(sitting for longer periods)  She currently denied hip pain pre-tx  Objective: See treatment diary below      Assessment: Tolerated addition of core exercises and treatment well  Patient demonstrated fatigue post treatment, exhibited good technique with therapeutic exercises and would benefit from continued PT      Plan: Continue per plan of care  Progress treatment as tolerated         Precautions: epilepsy     Manual ONCE per week  Manual  12/10 12/12 12/17 12/19 1/07 1/14  1/17 1/23        MFD B ITB   3' static  1' IR/ER  1' squat  glide 3' static  1' IR/ER  1' squat  glide -      NV         IASTM   NC TE -      NV                                                            Exercise Diary            Elliptical 7' 7' L5 7' L5 7'  8' L5 7'  L5 x8'          B piriformis stretch 3 x 30' ea 30"x3 L2 30"x3 ea                 Hip hike to neutral     -    30 ea 1R 30ea  1R 30ea           Hip abd iso c pball 20 ea c 5"  5"x20  ea 5"x20 ea Orange  10"x10ea               pball bridge c curl   nv [place] ball 2x10      18# KB blue pball 15x 18# KB blue pball 15x          Single leg deadlift 10 ea x10 ea      18#  10 ea 18# 25ea 18# 25ea          birdog   nv -    15 c 5"hold ea 5"x20ea  5"x20 ea          Suitcase carries     -                 plank     -                 Side plank     -                 1/2 sideplank w/hip abd iso     20"x3 ea 20"x3                  Prone pball iso HS curls, hip ext     Orange ball 5"x20                 Dynamic lunges (lateral & posterior contralateral)           10#ea hand  15xea 10#ea hand  15xea           side plank - elevated off low mat table            15"x3ea 15"x3ea  chair           pball plank mtn climbers & circles             x10 ea

## 2020-01-23 ENCOUNTER — OFFICE VISIT (OUTPATIENT)
Dept: PHYSICAL THERAPY | Facility: CLINIC | Age: 25
End: 2020-01-23
Payer: COMMERCIAL

## 2020-01-23 DIAGNOSIS — M25.551 BILATERAL HIP PAIN: Primary | ICD-10-CM

## 2020-01-23 DIAGNOSIS — M76.31 IT BAND SYNDROME, RIGHT: ICD-10-CM

## 2020-01-23 DIAGNOSIS — M25.552 BILATERAL HIP PAIN: Primary | ICD-10-CM

## 2020-01-23 DIAGNOSIS — M76.32 ILIOTIBIAL BAND SYNDROME OF LEFT SIDE: ICD-10-CM

## 2020-01-23 PROCEDURE — 97112 NEUROMUSCULAR REEDUCATION: CPT

## 2020-01-23 PROCEDURE — 97110 THERAPEUTIC EXERCISES: CPT

## 2020-01-23 NOTE — PROGRESS NOTES
Daily Note     Today's date: 2020  Patient name: Dunia Nunez  : 1995  MRN: [id number]  Referring provider: Suzanne Cabrera PA-C  Dx:   Encounter Diagnosis     ICD-10-CM    1  Bilateral hip pain M25 551     M25 552    2  Iliotibial band syndrome of left side M76 32    3  It band syndrome, right M76 31                   Subjective: Pt reports with c/o muscle soreness following last visit, but less than previous session  She denied pain prior to beginning today  She reported tightness in quads only  Objective: See treatment diary below      Assessment: Tolerated treatment well  Patient demonstrated fatigue post treatment, exhibited good technique with therapeutic exercises and would benefit from continued PT      Plan: Continue per plan of care  Progress treatment as tolerated         Precautions: epilepsy     Manual ONCE per week  Manual  12/10 12/12 12/17 12/19 1/07 1/14  1/17 1/21 1/23     MFD B ITB   3' static  1' IR/ER  1' squat  glide 3' static  1' IR/ER  1' squat  glide -      NV         IASTM   NC TE -      NV                                                            Exercise Diary          Elliptical 7' 7' L5 7' L5 7'  8' L5 7'  L5 x8'  L5 x8'         B piriformis stretch 3 x 30' ea 30"x3 L2 30"x3 ea                 Hip hike to neutral     -    30 ea 1R 30ea  1R 30ea   1R 30ea        Hip abd iso c pball 20 ea c 5"  5"x20  ea 5"x20 ea Orange  10"x10ea               pball bridge c curl   nv [place] ball 2x10      18# KB blue pball 15x 18# KB blue pball 15x  18# KB blue pball x20        Single leg deadlift 10 ea x10 ea      18#  10 ea 18# 25ea 18# 25ea   18# 25ea       birdog   nv -    15 c 5"hold ea 5"x20ea  5"x20 ea  5"x20 ea       Suitcase carries     -                 plank     -                 Side plank     -                 1/2 sideplank w/hip abd iso     20"x3 ea 20"x3          /c TB clam nv        Prone pball iso HS curls, hip ext     Orange ball 5"x20                 Dynamic lunges (lateral & posterior contralateral)           10#ea hand  15xea 10#ea hand  15xea   10#ea hand x20ea        side plank - elevated off low mat table            15"x3ea 15"x3ea  chair  15"x3ea  chair          pball plank mtn climbers & circles             x10 ea  x10 ea

## 2020-01-28 ENCOUNTER — OFFICE VISIT (OUTPATIENT)
Dept: PHYSICAL THERAPY | Facility: CLINIC | Age: 25
End: 2020-01-28
Payer: COMMERCIAL

## 2020-01-28 DIAGNOSIS — M76.32 ILIOTIBIAL BAND SYNDROME OF LEFT SIDE: ICD-10-CM

## 2020-01-28 DIAGNOSIS — M76.31 IT BAND SYNDROME, RIGHT: ICD-10-CM

## 2020-01-28 DIAGNOSIS — M25.552 BILATERAL HIP PAIN: Primary | ICD-10-CM

## 2020-01-28 DIAGNOSIS — M25.551 BILATERAL HIP PAIN: Primary | ICD-10-CM

## 2020-01-28 PROCEDURE — 97110 THERAPEUTIC EXERCISES: CPT

## 2020-01-28 PROCEDURE — 97112 NEUROMUSCULAR REEDUCATION: CPT

## 2020-01-28 PROCEDURE — 97530 THERAPEUTIC ACTIVITIES: CPT

## 2020-01-28 NOTE — PROGRESS NOTES
Daily Note     Today's date: 2020  Patient name: Yamileth Bajwa  : 1995  MRN: [id number]  Referring provider: Fabrizio Lane PA-C  Dx:   Encounter Diagnosis     ICD-10-CM    1  Bilateral hip pain M25 551     M25 552    2  Iliotibial band syndrome of left side M76 32    3  It band syndrome, right M76 31                   Subjective: Pt reports improved confidence in doing activities that previously caused pain without pain now  She notes improved core stability but notes balance deficits on RLE      Objective: See treatment diary below     Precautions: epilepsy     Manual ONCE per week  Manual       MFD B ITB 3' static  1' IR/ER  1' squat  glide 3' static  1' IR/ER  1' squat  glide           IASTM NC TE              Exercise Diary        Elliptical 7' 7' L5 7' L5 7'  8' L5 7'  L5 x8'  L5 x8'   L5 8'      B piriformis stretch 3 x 30' ea 30"x3 L2 30"x3 ea           -      Hip hike to neutral     -    30 ea 1R 30ea  1R 30ea   1R 30ea  2R 30x ea      Hip abd iso c pball 20 ea c 5"  5"x20  ea 5"x20 ea Orange  10"x10ea         -      pball bridge c curl   nv [place] ball 2x10      18# KB blue pball 15x 18# KB blue pball 15x  18# KB blue pball x20  20# (DBs)   blue pball  5"x20      Single leg deadlift 10 ea x10 ea      18#  10 ea 18# 25ea 18# 25ea   18# 25ea  24#   15x ea opp     birdog   nv -    15 c 5"hold ea 5"x20ea  5"x20 ea  5"x20 ea 5"x20 ea      Suitcase carries     -           -      plank     -           -      Side plank     -           -      1/2 sideplank w/hip abd iso     20"x3 ea 20"x3          /c TB clam nv  5"x20  OTB     Prone pball iso HS curls, hip ext     Orange ball 5"x20           -      Dynamic lunges (lateral & posterior contralateral)           10#ea hand  15xea 10#ea hand  15xea   10#ea hand x20ea  10#ea hand x20ea      side plank - elevated off low mat table            15"x3ea 15"x3ea  chair  15"x3ea  chair   20"x3 ea chair      pball plank mtn climbers & circles             x10 ea  x10 ea 10x ea                                  Assessment: Tolerated treatment well  Patient demonstrated fatigue post treatment and exhibited good technique with therapeutic exercises  Pt appropriate for possible D/C  Plan: Potential discharge next visit      Melecio Julien PTA

## 2020-01-30 ENCOUNTER — EVALUATION (OUTPATIENT)
Dept: PHYSICAL THERAPY | Facility: CLINIC | Age: 25
End: 2020-01-30
Payer: COMMERCIAL

## 2020-01-30 DIAGNOSIS — M76.32 ILIOTIBIAL BAND SYNDROME OF LEFT SIDE: ICD-10-CM

## 2020-01-30 DIAGNOSIS — M25.552 BILATERAL HIP PAIN: Primary | ICD-10-CM

## 2020-01-30 DIAGNOSIS — M25.551 BILATERAL HIP PAIN: Primary | ICD-10-CM

## 2020-01-30 DIAGNOSIS — M76.31 IT BAND SYNDROME, RIGHT: ICD-10-CM

## 2020-01-30 PROCEDURE — 97112 NEUROMUSCULAR REEDUCATION: CPT | Performed by: PHYSICAL MEDICINE & REHABILITATION

## 2020-01-30 PROCEDURE — 97140 MANUAL THERAPY 1/> REGIONS: CPT | Performed by: PHYSICAL MEDICINE & REHABILITATION

## 2020-01-30 PROCEDURE — 97110 THERAPEUTIC EXERCISES: CPT | Performed by: PHYSICAL MEDICINE & REHABILITATION

## 2020-01-30 NOTE — PROGRESS NOTES
PT Discharge    Today's date: 2020  Patient name: Alisha Fernandez  : 1995  MRN: [id number]  Referring provider: Allie Balbuena PA-C  Dx:   Encounter Diagnosis     ICD-10-CM    1  Bilateral hip pain M25 551     M25 552    2  Iliotibial band syndrome of left side M76 32    3  It band syndrome, right M76 31        Start Time: 1730  Stop Time: 1828  Total time in clinic (min): 58 minutes    Assessment  Assessment details: Norris Reyna has been attending outpatient physical therapy with Bilateral hip pain  Since the last assessment, patient demonstrates decreased pain levels, improved range of motion, improved strength, and increased tolerance to activity  Pt has reached maximal benefit of skilled physical therapy and will be discharged at this time to home exercise program   Understanding of Dx/Px/POC: good   Prognosis: good    Goals  ST  Patient will report 25% decrease in pain in 4 weeks  MET  2  Patient will demonstrate 25% improvement in ROM in 4 weeks  MET  3  Patient will demonstrate 1/2 grade improvement in strength in 4 weeks  MET    LT  Patient will be able to perform IADLS without restriction or pain by discharge  MET  2  Patient will be independent in HEP by discharge  MET  3  Patient will be able to return to recreational/work duties without restriction or pain by discharge  MET      Plan  Plan details: D/C to HEP  Planned therapy interventions: home exercise program  Treatment plan discussed with: patient        Subjective Evaluation    History of Present Illness  Mechanism of injury: Pt reports experiencing B hip pain described as sharp and numbing  Onset of sxs approximately 3 weeks ago  Pt is a former field  and just graduated 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 in the past  Pt denies injury  She states that onset of sxs occurred one night while sleeping and the pain woke her up   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 that was causing knee pain  She has experienced intermittent back pain during this episode  (20) Pt reports significant improvement in frequency and intensity  Chief complaint is aching and muscle fatigue associated with activity  L> R pain  Pt reports ascending and descending stairs at work (10 flights) and being more active than before  (20) Pt states that she has not experienced pain in > 2 weeks, she feels much stronger and her endurance has improved  Her chief complaint is B quad tightness  Pain  Current pain ratin  At best pain ratin  At worst pain ratin  Quality: tight  Progression: improved    Patient Goals  Patient goals for therapy: decreased pain  Patient goal: MET        Objective     Tenderness   Left Knee   Tenderness in the ITB  Right Knee   Tenderness in the ITB  Passive Range of Motion   Left Hip   External rotation (90/90): 75 degrees   Internal rotation (90/90): 60 degrees     Right Hip   External rotation (90/90): 75 degrees   Internal rotation (90/90): 60 degrees     Strength/Myotome Testing     Left Hip   Planes of Motion   Flexion: 5  Extension: 5  Abduction: 5  External rotation: 5  Internal rotation: 5    Right Hip   Planes of Motion   Flexion: 5  Extension: 5  Abduction: 5  External rotation: 5  Internal rotation: 5    Left Knee   Prone flexion: 4+    Right Knee   Prone flexion: 4+    Tests     Left Hip   Negative AMARIS, FADIR and piriformis  Modified Shaheen: Negative  Right Hip   Negative AMARIS, FADIR and piriformis  Modified Shaheen: Negative         Flowsheet Rows      Most Recent Value   PT/OT G-Codes   Current Score  87   Projected Score  82             Precautions: epilepsy     Manual ONCE per week  Manual        MFD B ITB 3' static  1' IR/ER  1' squat  glide 3' static  1' IR/ER  1' squat  glide                   IAS NC TE                      Exercise Diary   1/21 1/23 1/28      Elliptical 7' 7' L5 7' L5 7'  8' L5 7'  L5 x8'  L5 x8'   L5 8'      B piriformis stretch 3 x 30' ea 30"x3 L2 30"x3 ea           -      Hip hike to neutral     -    30 ea 1R 30ea  1R 30ea   1R 30ea  2R 30x ea      Hip abd iso c pball 20 ea c 5"  5"x20  ea 5"x20 ea Orange  10"x10ea         -      pball bridge c curl   nv [place] ball 2x10      18# KB blue pball 15x 18# KB blue pball 15x  18# KB blue pball x20  20# (DBs)   blue pball  5"x20      Single leg deadlift 10 ea x10 ea      18#  10 ea 18# 25ea 18# 25ea   18# 25ea  24#   15x ea opp     birdog   nv -    15 c 5"hold ea 5"x20ea  5"x20 ea  5"x20 ea 5"x20 ea      Suitcase carries     -           -      plank     -           -      Side plank     -           -      1/2 sideplank w/hip abd iso     20"x3 ea 20"x3          /c TB clam nv  5"x20  OTB     Prone pball iso HS curls, hip ext     [place] ball 5"x20           -      Dynamic lunges (lateral & posterior contralateral)           10#ea hand  15xea 10#ea hand  15xea   10#ea hand x20ea  10#ea hand x20ea      side plank - elevated off low mat table            15"x3ea 15"x3ea  chair  15"x3ea  chair   20"x3 ea chair      pball plank mtn climbers & circles             x10 ea  x10 ea 10x ea

## 2020-02-18 ENCOUNTER — ANNUAL EXAM (OUTPATIENT)
Dept: OBGYN CLINIC | Facility: CLINIC | Age: 25
End: 2020-02-18
Payer: COMMERCIAL

## 2020-02-18 VITALS
WEIGHT: 133 LBS | HEIGHT: 62 IN | BODY MASS INDEX: 24.48 KG/M2 | DIASTOLIC BLOOD PRESSURE: 64 MMHG | SYSTOLIC BLOOD PRESSURE: 108 MMHG

## 2020-02-18 DIAGNOSIS — Z30.9 ENCOUNTER FOR CONTRACEPTIVE MANAGEMENT, UNSPECIFIED TYPE: ICD-10-CM

## 2020-02-18 DIAGNOSIS — Z01.419 ENCOUNTER FOR GYNECOLOGICAL EXAMINATION WITHOUT ABNORMAL FINDING: Primary | ICD-10-CM

## 2020-02-18 DIAGNOSIS — Z23 NEED FOR HPV VACCINATION: ICD-10-CM

## 2020-02-18 PROCEDURE — 99395 PREV VISIT EST AGE 18-39: CPT | Performed by: PHYSICIAN ASSISTANT

## 2020-02-18 PROCEDURE — 90471 IMMUNIZATION ADMIN: CPT

## 2020-02-18 PROCEDURE — 90651 9VHPV VACCINE 2/3 DOSE IM: CPT

## 2020-02-25 LAB
CLINICAL INFO: ABNORMAL
CYTO CVX: ABNORMAL
DATE PREVIOUS BX: ABNORMAL
GEN CATEG CVX/VAG CYTO-IMP: ABNORMAL
HPV E6+E7 MRNA CVX QL NAA+PROBE: DETECTED
LMP START DATE: ABNORMAL
SL AMB PREV. PAP:: ABNORMAL
SPECIMEN SOURCE CVX/VAG CYTO: ABNORMAL

## 2020-02-26 ENCOUNTER — TELEPHONE (OUTPATIENT)
Dept: OBGYN CLINIC | Facility: CLINIC | Age: 25
End: 2020-02-26

## 2020-02-26 NOTE — TELEPHONE ENCOUNTER
Spoke with patient regarding Pap results - ASCUS with positive HR HPV  Explained diagnosis to patient  Immune system is most likely to clear virus within 2 years  Can be passed through sexual contact; stressed consistent condom use  Patient should still finish course of Gardasil vaccine  F/u for repeat Pap/HPV testing in 1 year as planned  Call with further questions

## 2020-02-28 ENCOUNTER — TELEPHONE (OUTPATIENT)
Dept: OBGYN CLINIC | Facility: CLINIC | Age: 25
End: 2020-02-28

## 2020-02-28 NOTE — TELEPHONE ENCOUNTER
Patient called with further questions regarding Pap  Reassured patient that she is lower risk due to age and cytology  [facility] testing only looks for HPV mRNA; does not differentiate strains  Call with further questions

## 2020-06-18 ENCOUNTER — CLINICAL SUPPORT (OUTPATIENT)
Dept: OBGYN CLINIC | Facility: CLINIC | Age: 25
End: 2020-06-18
Payer: COMMERCIAL

## 2020-06-18 VITALS
WEIGHT: 126.1 LBS | BODY MASS INDEX: 23.06 KG/M2 | SYSTOLIC BLOOD PRESSURE: 108 MMHG | DIASTOLIC BLOOD PRESSURE: 66 MMHG | TEMPERATURE: 98 F

## 2020-06-18 DIAGNOSIS — Z23 NEED FOR HPV VACCINATION: Primary | ICD-10-CM

## 2020-06-18 PROCEDURE — 90651 9VHPV VACCINE 2/3 DOSE IM: CPT | Performed by: OBSTETRICS & GYNECOLOGY

## 2020-06-18 PROCEDURE — 90471 IMMUNIZATION ADMIN: CPT | Performed by: OBSTETRICS & GYNECOLOGY

## 2020-10-26 DIAGNOSIS — Z30.9 ENCOUNTER FOR CONTRACEPTIVE MANAGEMENT, UNSPECIFIED TYPE: ICD-10-CM

## 2020-10-27 RX ORDER — NORETHINDRONE ACETATE AND ETHINYL ESTRADIOL, ETHINYL ESTRADIOL AND FERROUS FUMARATE 1MG-10(24)
KIT ORAL
Qty: 84 TABLET | Refills: 0 | Status: SHIPPED | OUTPATIENT
Start: 2020-10-27 | End: 2021-02-05 | Stop reason: SDUPTHER

## 2021-02-02 DIAGNOSIS — Z30.9 ENCOUNTER FOR CONTRACEPTIVE MANAGEMENT, UNSPECIFIED TYPE: ICD-10-CM

## 2021-02-03 RX ORDER — NORETHINDRONE ACETATE AND ETHINYL ESTRADIOL, ETHINYL ESTRADIOL AND FERROUS FUMARATE 1MG-10(24)
KIT ORAL
Qty: 84 TABLET | Refills: 0 | OUTPATIENT
Start: 2021-02-03

## 2021-02-05 DIAGNOSIS — Z30.9 ENCOUNTER FOR CONTRACEPTIVE MANAGEMENT, UNSPECIFIED TYPE: ICD-10-CM

## 2021-02-05 RX ORDER — NORETHINDRONE ACETATE AND ETHINYL ESTRADIOL, ETHINYL ESTRADIOL AND FERROUS FUMARATE 1MG-10(24)
KIT ORAL
Qty: 84 TABLET | Refills: 0 | Status: SHIPPED | OUTPATIENT
Start: 2021-02-05

## 2021-02-05 NOTE — TELEPHONE ENCOUNTER
Pt moved to [place] and is in the process of locating another GYN provider  Can you please provide pt with a 90 day supply to hold her to an appt   Pharmacy is [id number] [place], [address]

## 2024-02-26 NOTE — PROGRESS NOTES
I increased the dose to 100 mg.  Advise her to not increase dose further   PT Re-Evaluation     Today's date: 2020  Patient name: Karin Feliz  : 1995  MRN: [id number]  Referring provider: Lopez Mchugh PA-C  Dx:   Encounter Diagnosis     ICD-10-CM    1  Bilateral hip pain M25 551     M25 552    2  Iliotibial band syndrome of left side M76 32    3  It band syndrome, right M76 31        Start Time: 1730  Stop Time: 1830  Total time in clinic (min): 60 minutes    Assessment  Assessment details: Jessa Radford has been attending outpatient physical therapy with Bilateral hip pain, B Iliotibial band syndrome  Since the last assessment, patient demonstrates decreased pain levels, improved range of motion, improved strength, and increased tolerance to activity  Pt continues to present with pain, demonstrate decreased range of motion, decreased strength, and decreased tolerance to activity  Pt would benefit from continued skilled physical therapy to address limitations and to achieve goals  Thank you for this referral    Impairments: abnormal or restricted ROM, impaired physical strength, lacks appropriate home exercise program and pain with function    Symptom irritability: moderateUnderstanding of Dx/Px/POC: good   Prognosis: good    Goals  ST  Patient will report 25% decrease in pain in 4 weeks  MET  2  Patient will demonstrate 25% improvement in ROM in 4 weeks  MET  3  Patient will demonstrate 1/2 grade improvement in strength in 4 weeks  MET    LT  Patient will be able to perform IADLS without restriction or pain by discharge  Improved  2  Patient will be independent in HEP by discharge  Progressing  3  Patient will be able to return to recreational/work duties without restriction or pain by discharge   Improved      Plan  Patient would benefit from: PT eval and skilled physical therapy  Planned modality interventions: biofeedback, cryotherapy, TENS and thermotherapy: hydrocollator packs  Planned therapy interventions: joint mobilization, neuromuscular re-education, patient education, postural training, strengthening, stretching, therapeutic activities, therapeutic exercise, abdominal trunk stabilization, balance and home exercise program  Frequency: 2x week  Duration in weeks: 3  Treatment plan discussed with: patient        Subjective Evaluation    History of Present Illness  Mechanism of injury: Pt reports experiencing B hip pain described as sharp and numbing  Onset of sxs approximately 3 weeks ago  Pt is a former field  and just graduated 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 in the past  Pt denies injury  She states that onset of sxs occurred one night while sleeping and the pain woke her up  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 that was causing knee pain  She has experienced intermittent back pain during this episode  (20) Pt reports significant improvement in frequency and intensity  Chief complaint is aching and muscle fatigue associated with activity  L> R pain  Pt reports ascending and descending stairs at work (10 flights) and being more active than before  Pain  Current pain ratin  At best pain ratin  At worst pain ratin  Quality: dull ache  Progression: improved    Patient Goals  Patient goals for therapy: decreased pain  Patient goal: Improved        Objective     Palpation   Left   Tenderness of the piriformis  Right   Tenderness of the piriformis  Tenderness     Left Hip   Tenderness in the greater trochanter  Right Hip   Tenderness in the greater trochanter  Left Knee   Tenderness in the ITB  Right Knee   Tenderness in the ITB       Active Range of Motion   Left Hip   External rotation (90/90): 43 degrees   Internal rotation (90/90): 45 degrees     Right Hip   External rotation (90/90): 43 degrees   Internal rotation (90/90): 45 degrees     Passive Range of Motion   Left Hip   External rotation (90/90): 75 degrees   Internal rotation (90/90): 60 degrees Right Hip   External rotation (90/90): 75 degrees   Internal rotation (90/90): 60 degrees     Strength/Myotome Testing     Left Hip   Planes of Motion   Flexion: 4+  Extension: 4+  Abduction: 3+  External rotation: 4  Internal rotation: 4    Right Hip   Planes of Motion   Flexion: 4+  Extension: 4+  Abduction: 4+  External rotation: 4  Internal rotation: 4    Left Knee   Prone flexion: 4+    Right Knee   Prone flexion: 4+    Tests     Left Hip   Positive piriformis  Negative AMARIS and FADIR  Modified Shaheen: Positive  Right Hip   Positive piriformis  Negative AMARIS and FADIR  Modified Shaheen: Positive                Precautions: epilepsy     Manual ONCE per week  Manual  12/10 12/12 12/17 12/19 1/07             MFD B ITB   3' static  1' IR/ER  1' squat  glide 3' static  1' IR/ER  1' squat  glide -               IASTM   NC TE -                                                                  Exercise Diary  12/12 12/17 12/19 1/07 1/14             Elliptical 7' 7' L5 7' L5 7'  8'             B piriformis stretch 3 x 30' ea 30"x3 L2 30"x3 ea                 Hip hike to neutral     -    30 ea             Hip abd iso c pball 20 ea c 5"  5"x20  ea 5"x20 ea [place]  10"x10ea               pball bridge c curl   nv [place] ball 2x10                 Single leg deadlift 10 ea x10 ea      18#  10 ea             birdog   nv -    15 c 5"hold ea             Suitcase carries     -                 plank     -                 Side plank     -                 1/2 sideplank w/hip abd iso     20"x3 ea 20"x3                  Prone pball iso HS curls, hip ext     [place] ball 5"x20